# Patient Record
Sex: FEMALE | Race: WHITE | NOT HISPANIC OR LATINO
[De-identification: names, ages, dates, MRNs, and addresses within clinical notes are randomized per-mention and may not be internally consistent; named-entity substitution may affect disease eponyms.]

---

## 2019-02-01 ENCOUNTER — RX RENEWAL (OUTPATIENT)
Age: 19
End: 2019-02-01

## 2019-02-20 ENCOUNTER — RECORD ABSTRACTING (OUTPATIENT)
Age: 19
End: 2019-02-20

## 2019-02-20 DIAGNOSIS — Z80.3 FAMILY HISTORY OF MALIGNANT NEOPLASM OF BREAST: ICD-10-CM

## 2019-02-20 DIAGNOSIS — Z78.9 OTHER SPECIFIED HEALTH STATUS: ICD-10-CM

## 2019-03-12 ENCOUNTER — APPOINTMENT (OUTPATIENT)
Dept: OBGYN | Facility: CLINIC | Age: 19
End: 2019-03-12
Payer: COMMERCIAL

## 2019-03-12 VITALS
DIASTOLIC BLOOD PRESSURE: 60 MMHG | SYSTOLIC BLOOD PRESSURE: 100 MMHG | BODY MASS INDEX: 19.88 KG/M2 | WEIGHT: 108 LBS | HEIGHT: 62 IN

## 2019-03-12 LAB
BILIRUB UR QL STRIP: NORMAL
CLARITY UR: CLEAR
COLLECTION METHOD: NORMAL
GLUCOSE UR-MCNC: NORMAL
HCG UR QL: 1 EU/DL
HCG UR QL: NEGATIVE
HGB UR QL STRIP.AUTO: NORMAL
KETONES UR-MCNC: NORMAL
LEUKOCYTE ESTERASE UR QL STRIP: NORMAL
NITRITE UR QL STRIP: NORMAL
PH UR STRIP: 7.5
PROT UR STRIP-MCNC: NORMAL
QUALITY CONTROL: YES
SP GR UR STRIP: 1.02

## 2019-03-12 PROCEDURE — 99395 PREV VISIT EST AGE 18-39: CPT

## 2019-03-12 NOTE — REVIEW OF SYSTEMS
[Abdominal Pain] : abdominal pain [Painful Periods] : painful periods [Irregular Cycle Intervals] : periods are irregular [Dizziness] : dizziness [Headache] : headache [Anxiety] : anxiety [Nl] : Integumentary

## 2019-03-14 ENCOUNTER — APPOINTMENT (OUTPATIENT)
Dept: OBGYN | Facility: CLINIC | Age: 19
End: 2019-03-14
Payer: COMMERCIAL

## 2019-03-14 VITALS
SYSTOLIC BLOOD PRESSURE: 100 MMHG | BODY MASS INDEX: 19.88 KG/M2 | DIASTOLIC BLOOD PRESSURE: 60 MMHG | HEIGHT: 62 IN | WEIGHT: 108 LBS

## 2019-03-14 LAB
C TRACH RRNA SPEC QL NAA+PROBE: NOT DETECTED
N GONORRHOEA RRNA SPEC QL NAA+PROBE: NOT DETECTED
SOURCE AMPLIFICATION: NORMAL

## 2019-03-14 PROCEDURE — 99213 OFFICE O/P EST LOW 20 MIN: CPT | Mod: 25

## 2019-03-14 PROCEDURE — 76830 TRANSVAGINAL US NON-OB: CPT

## 2019-03-14 NOTE — COUNSELING
[Breast Self Exam] : breast self exam [Nutrition] : nutrition [Exercise] : exercise [STD (testing, results, tx)] : STD (testing, results, tx) [Contraception] : contraception

## 2019-03-14 NOTE — DISCUSSION/SUMMARY
[FreeTextEntry1] : pt will have a pelvic US for further evaluation of her pain. \par \par She is also interested in switching to a different dose of her pill in order to have a withdrawl bleed.  She will start junel fe 1/5/30.\par \par She was referred to dermatology for evaluation of her excessive under arm sweating.

## 2019-03-14 NOTE — PHYSICAL EXAM
[Awake] : awake [Alert] : alert [Soft] : soft [Tender] : non tender [Distended] : not distended [Oriented x3] : oriented to person, place, and time [Normal] : uterus [Labia Majora] : labia major [Tenderness] : nontender [Enlarged ___ wks] : not enlarged [Mass ___ cm] : no uterine mass was palpated [Uterine Adnexae] : were not tender and not enlarged [Adnexa Tenderness] : were not tender [Ovarian Mass (___ Cm)] : there were no adnexal masses

## 2019-03-15 NOTE — DISCUSSION/SUMMARY
[FreeTextEntry1] : f/u with GI/PCP as scheduled.\par \par she will monitor her symptoms on the new ocps.  \par \par if problems persist she will f/u.

## 2019-03-15 NOTE — PHYSICAL EXAM
[Awake] : awake [Alert] : alert [Mass] : no breast mass [Nipple Discharge] : no nipple discharge [Tender] : non tender [Distended] : not distended [Oriented x3] : oriented to person, place, and time

## 2019-05-28 ENCOUNTER — RECORD ABSTRACTING (OUTPATIENT)
Age: 19
End: 2019-05-28

## 2019-05-30 ENCOUNTER — APPOINTMENT (OUTPATIENT)
Dept: OBGYN | Facility: CLINIC | Age: 19
End: 2019-05-30
Payer: COMMERCIAL

## 2019-05-30 VITALS
SYSTOLIC BLOOD PRESSURE: 100 MMHG | BODY MASS INDEX: 19.69 KG/M2 | DIASTOLIC BLOOD PRESSURE: 65 MMHG | HEIGHT: 62 IN | WEIGHT: 107 LBS

## 2019-05-30 LAB
BILIRUB UR QL STRIP: NORMAL
GLUCOSE UR-MCNC: NORMAL
HCG UR QL: 1 EU/DL
HCG UR QL: NEGATIVE
HGB UR QL STRIP.AUTO: NORMAL
KETONES UR-MCNC: NORMAL
LEUKOCYTE ESTERASE UR QL STRIP: NORMAL
NITRITE UR QL STRIP: NORMAL
PH UR STRIP: 6
PROT UR STRIP-MCNC: NORMAL
QUALITY CONTROL: YES
SP GR UR STRIP: 1.02

## 2019-05-30 PROCEDURE — 81003 URINALYSIS AUTO W/O SCOPE: CPT | Mod: QW

## 2019-05-30 PROCEDURE — 99213 OFFICE O/P EST LOW 20 MIN: CPT

## 2019-05-30 PROCEDURE — 81025 URINE PREGNANCY TEST: CPT

## 2019-05-31 NOTE — PHYSICAL EXAM
[Awake] : awake [Alert] : alert [Soft] : soft [Tender] : non tender [Distended] : not distended [Oriented x3] : oriented to person, place, and time [Normal] : uterus [Scant] : there was scant vaginal bleeding [Tenderness] : nontender [Enlarged ___ wks] : not enlarged [Mass ___ cm] : no uterine mass was palpated [Uterine Adnexae] : were not tender and not enlarged

## 2019-05-31 NOTE — HISTORY OF PRESENT ILLNESS
[___ Month(s) Ago] : [unfilled] month(s) ago [Good] : being in good health [Healthy Diet] : a healthy diet [Regular Exercise] : regular exercise [Reproductive Age] : is of reproductive age [Last Screen ___] : last STD screen was [unfilled] [Oral Contraceptive] : uses oral contraception pills [Definite:  ___ (Date)] : the last menstrual period was [unfilled] [Menarche Age: ____] : age at menarche was [unfilled] [Sexually Active] : is sexually active [Contraception] : uses contraception [Oral Contraceptives] : uses oral contraceptives [Male ___] : [unfilled] male [Weight Concerns] : no concerns with her weight [Pregnancy History] : denies prior pregnancies

## 2019-05-31 NOTE — DISCUSSION/SUMMARY
[FreeTextEntry1] : check cultures- tx based on results.  \par \par mother present for visit also a patient (lindsay)

## 2019-06-01 LAB
C TRACH RRNA SPEC QL NAA+PROBE: NOT DETECTED
CANDIDA VAG CYTO: DETECTED
G VAGINALIS+PREV SP MTYP VAG QL MICRO: NOT DETECTED
N GONORRHOEA RRNA SPEC QL NAA+PROBE: NOT DETECTED
SOURCE AMPLIFICATION: NORMAL
T VAGINALIS VAG QL WET PREP: NOT DETECTED

## 2019-06-11 ENCOUNTER — APPOINTMENT (OUTPATIENT)
Dept: PEDIATRICS | Facility: CLINIC | Age: 19
End: 2019-06-11

## 2019-06-20 ENCOUNTER — APPOINTMENT (OUTPATIENT)
Dept: PEDIATRICS | Facility: CLINIC | Age: 19
End: 2019-06-20
Payer: COMMERCIAL

## 2019-06-20 VITALS
DIASTOLIC BLOOD PRESSURE: 68 MMHG | SYSTOLIC BLOOD PRESSURE: 108 MMHG | HEART RATE: 79 BPM | HEIGHT: 61.5 IN | BODY MASS INDEX: 19.61 KG/M2 | WEIGHT: 105.2 LBS

## 2019-06-20 DIAGNOSIS — Z80.0 FAMILY HISTORY OF MALIGNANT NEOPLASM OF DIGESTIVE ORGANS: ICD-10-CM

## 2019-06-20 PROCEDURE — 96127 BRIEF EMOTIONAL/BEHAV ASSMT: CPT

## 2019-06-20 PROCEDURE — 92552 PURE TONE AUDIOMETRY AIR: CPT

## 2019-06-20 PROCEDURE — 99395 PREV VISIT EST AGE 18-39: CPT | Mod: 25

## 2019-06-20 RX ORDER — ALBUTEROL SULFATE 90 UG/1
108 (90 BASE) AEROSOL, METERED RESPIRATORY (INHALATION) EVERY 4 HOURS
Refills: 0 | Status: DISCONTINUED | COMMUNITY
End: 2019-06-20

## 2019-06-20 RX ORDER — TERCONAZOLE 4 MG/G
0.4 CREAM VAGINAL
Qty: 1 | Refills: 0 | Status: DISCONTINUED | COMMUNITY
Start: 2019-06-01 | End: 2019-06-20

## 2019-06-20 RX ORDER — INHALER, ASSIST DEVICES
SPACER (EA) MISCELLANEOUS
Refills: 0 | Status: DISCONTINUED | COMMUNITY
End: 2019-06-20

## 2019-06-20 RX ORDER — NORETHINDRONE ACETATE AND ETHINYL ESTRADIOL AND FERROUS FUMARATE 1MG-20(21)
1-20 KIT ORAL
Qty: 2 | Refills: 0 | Status: DISCONTINUED | COMMUNITY
Start: 2019-02-01 | End: 2019-06-20

## 2019-06-20 NOTE — PHYSICAL EXAM
[Alert] : alert [No Acute Distress] : no acute distress [EOMI Bilateral] : EOMI bilateral [Normocephalic] : normocephalic [Clear tympanic membranes with bony landmarks and light reflex present bilaterally] : clear tympanic membranes with bony landmarks and light reflex present bilaterally  [Pink Nasal Mucosa] : pink nasal mucosa [Nonerythematous Oropharynx] : nonerythematous oropharynx [Supple, full passive range of motion] : supple, full passive range of motion [No Palpable Masses] : no palpable masses [Clear to Ausculatation Bilaterally] : clear to auscultation bilaterally [Normal S1, S2 audible] : normal S1, S2 audible [Regular Rate and Rhythm] : regular rate and rhythm [No Murmurs] : no murmurs [+2 Femoral Pulses] : +2 femoral pulses [Soft] : soft [NonTender] : non tender [Non Distended] : non distended [No Hepatomegaly] : no hepatomegaly [Normoactive Bowel Sounds] : normoactive bowel sounds [No Splenomegaly] : no splenomegaly [Normal Muscle Tone] : normal muscle tone [No Abnormal Lymph Nodes Palpated] : no abnormal lymph nodes palpated [No pain or deformities with palpation of bone, muscles, joints] : no pain or deformities with palpation of bone, muscles, joints [No Gait Asymmetry] : no gait asymmetry [Cranial Nerves Grossly Intact] : cranial nerves grossly intact [+2 Patella DTR] : +2 patella DTR [Straight] : straight [No Rash or Lesions] : no rash or lesions [[place]: ____] : [place] [unfilled]

## 2019-06-20 NOTE — DISCUSSION/SUMMARY
[] : The components of the vaccine(s) to be administered today are listed in the plan of care. The disease(s) for which the vaccine(s) are intended to prevent and the risks have been discussed with the caretaker.  The risks are also included in the appropriate vaccination information statements which have been provided to the patient's caregiver.  The caregiver has given consent to vaccinate. [FreeTextEntry1] : PATIENT HAS ONGOING ABDOMINAL PAIN  APPEARS TO BE EPIGASTRIC\par HAS SEEN G.I. 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

## 2019-06-20 NOTE — RISK ASSESSMENT
[0] : 2) Feeling down, depressed, or hopeless: Not at all (0) [FreeTextEntry1] : Inadequate sleep [[id number]] : PHQ 9 score 3

## 2019-06-20 NOTE — HISTORY OF PRESENT ILLNESS
[Mother] : mother [LMP: _____] : LMP: [unfilled] [Up to date] : Up to date [Eats meals with family] : eats meals with family [Has family members/adults to turn to for help] : has family members/adults to turn to for help [Is permitted and is able to make independent decisions] : Is permitted and is able to make independent decisions [Normal Behavior/Attention] : normal behavior/attention [Normal Homework] : normal homework [Normal Performance] : normal performance [Eats regular meals including adequate fruits and vegetables] : eats regular meals including adequate fruits and vegetables [Drinks non-sweetened liquids] : drinks non-sweetened liquids  [Calcium source] : calcium source [Has friends] : has friends [Screen time (except homework) less than 2 hours a day] : screen time (except homework) less than 2 hours a day [At least 1 hour of physical activity a day] : at least 1 hour of physical activity a day [Has interests/participates in community activities/volunteers] : has interests/participates in community activities/volunteers. [No] : No cigarette smoke exposure [Uses safety belts/safety equipment] : uses safety belts/safety equipment  [Has peer relationships free of violence] : has peer relationships free of violence [Yes] : Patient has had sexual intercourse. [Has ways to cope with stress] : has ways to cope with stress [Displays self-confidence] : displays self-confidence [Has concerns about body or appearance] : does not have concerns about body or appearance [Sleep Concerns] : no sleep concerns [Exposure to electronic nicotine delivery system] : no exposure to electronic nicotine delivery system [Uses tobacco] : does not use tobacco [Uses electronic nicotine delivery system] : does not use electronic nicotine delivery system [Exposure to drugs] : no exposure to drugs [Uses drugs] : does not use drugs  [Exposure to tobacco] : no exposure to tobacco [Drinks alcohol] : does not drink alcohol [Impaired/distracted driving] : no impaired/distracted driving [Exposure to alcohol] : no exposure to alcohol [Has problems with sleep] : does not have problems with sleep [Gets depressed, anxious, or irritable/has mood swings] : does not get depressed, anxious, or irritable/has mood swings [Has thought about hurting self or considered suicide] : has not thought about hurting self or considered suicide [FreeTextEntry7] : 19 year well visit [FreeTextEntry8] : on Junel  every 6 months  [de-identified] : RECURRENT UPPER ABDOMINAL PAIN [de-identified] : finished 2nd year college  PRE MED [de-identified] : SEES GYNECOLOGY FOR ROUTINE VISITS AND LAB WORK

## 2020-03-15 ENCOUNTER — APPOINTMENT (OUTPATIENT)
Dept: PEDIATRICS | Facility: CLINIC | Age: 20
End: 2020-03-15
Payer: COMMERCIAL

## 2020-03-15 VITALS — TEMPERATURE: 97.2 F | WEIGHT: 106.5 LBS

## 2020-03-15 LAB — S PYO AG SPEC QL IA: NEGATIVE

## 2020-03-15 PROCEDURE — 87880 STREP A ASSAY W/OPTIC: CPT | Mod: QW

## 2020-03-15 PROCEDURE — 99213 OFFICE O/P EST LOW 20 MIN: CPT | Mod: 25

## 2020-03-15 NOTE — DISCUSSION/SUMMARY
[FreeTextEntry1] : Symptomatic treatment of fever and/or pain discussed\par Stat strep test ordered\par Throat culture, if POSITIVE, give Duricef 500mg BID x 10 days\par Hydrate well\par Handwashing and infection control discussed\par Return to office if febrile > 48 hours or if symptoms get worse\par Go to ER if unable to come to the office or during after hours, parent encouraged to call service first before doing so.\par

## 2020-03-15 NOTE — PHYSICAL EXAM
[Erythematous Oropharynx] : erythematous oropharynx [Nontender Cervical Lymph Nodes] : nontender cervical lymph nodes [Supple] : supple [FROM] : full passive range of motion [NL] : no abnormal lymph nodes palpated

## 2020-03-15 NOTE — HISTORY OF PRESENT ILLNESS
[de-identified] : sore throat x 1 day [FreeTextEntry6] : Afebrile\par stomach ache\par no cough or congestion\par no headache

## 2020-04-01 ENCOUNTER — APPOINTMENT (OUTPATIENT)
Dept: OBGYN | Facility: CLINIC | Age: 20
End: 2020-04-01
Payer: COMMERCIAL

## 2020-04-01 VITALS
WEIGHT: 105 LBS | HEIGHT: 61.5 IN | DIASTOLIC BLOOD PRESSURE: 70 MMHG | BODY MASS INDEX: 19.57 KG/M2 | SYSTOLIC BLOOD PRESSURE: 106 MMHG

## 2020-04-01 LAB
BILIRUB UR QL STRIP: NORMAL
CLARITY UR: CLEAR
COLLECTION METHOD: NORMAL
GLUCOSE UR-MCNC: NORMAL
HCG UR QL: 0.2 EU/DL
HGB UR QL STRIP.AUTO: ABNORMAL
KETONES UR-MCNC: NORMAL
LEUKOCYTE ESTERASE UR QL STRIP: ABNORMAL
NITRITE UR QL STRIP: NORMAL
PH UR STRIP: 5.5
PROT UR STRIP-MCNC: ABNORMAL
SP GR UR STRIP: 1.03

## 2020-04-01 PROCEDURE — 81003 URINALYSIS AUTO W/O SCOPE: CPT | Mod: QW

## 2020-04-01 PROCEDURE — 99213 OFFICE O/P EST LOW 20 MIN: CPT

## 2020-04-01 NOTE — PHYSICAL EXAM
[Normal] : uterus [Discharge] : a  ~M vaginal discharge was present [Heavy] : heavy [Foul Smelling] : not foul smelling [White] : white [Curds] : curd-like [No Bleeding] : there was no active vaginal bleeding [Uterine Adnexae] : were not tender and not enlarged

## 2020-04-01 NOTE — HISTORY OF PRESENT ILLNESS
[___ Month(s) Ago] : [unfilled] month(s) ago [Good] : being in good health [Healthy Diet] : a healthy diet [Reproductive Age] : is of reproductive age [Sexually Active] : is sexually active [Male ___] : [unfilled] male [Last Screen ___] : last STD screen was [unfilled] [Definite:  ___ (Date)] : the last menstrual period was [unfilled]

## 2020-04-10 ENCOUNTER — APPOINTMENT (OUTPATIENT)
Dept: OBGYN | Facility: CLINIC | Age: 20
End: 2020-04-10

## 2020-05-29 ENCOUNTER — APPOINTMENT (OUTPATIENT)
Dept: OBGYN | Facility: CLINIC | Age: 20
End: 2020-05-29
Payer: COMMERCIAL

## 2020-05-29 VITALS
HEIGHT: 62 IN | SYSTOLIC BLOOD PRESSURE: 102 MMHG | DIASTOLIC BLOOD PRESSURE: 60 MMHG | WEIGHT: 105 LBS | BODY MASS INDEX: 19.32 KG/M2

## 2020-05-29 DIAGNOSIS — N76.0 ACUTE VAGINITIS: ICD-10-CM

## 2020-05-29 DIAGNOSIS — Z87.898 PERSONAL HISTORY OF OTHER SPECIFIED CONDITIONS: ICD-10-CM

## 2020-05-29 LAB
HCG UR QL: NEGATIVE
QUALITY CONTROL: YES

## 2020-05-29 PROCEDURE — 99395 PREV VISIT EST AGE 18-39: CPT

## 2020-05-29 PROCEDURE — 81025 URINE PREGNANCY TEST: CPT

## 2020-05-29 RX ORDER — CHLORHEXIDINE GLUCONATE, 0.12% ORAL RINSE 1.2 MG/ML
0.12 SOLUTION DENTAL
Qty: 473 | Refills: 0 | Status: COMPLETED | COMMUNITY
Start: 2019-12-26 | End: 2020-05-29

## 2020-05-29 RX ORDER — HYDROCODONE BITARTRATE AND ACETAMINOPHEN 5; 325 MG/1; MG/1
5-325 TABLET ORAL
Qty: 12 | Refills: 0 | Status: COMPLETED | COMMUNITY
Start: 2019-12-26 | End: 2020-05-29

## 2020-05-29 RX ORDER — CLOTRIMAZOLE AND BETAMETHASONE DIPROPIONATE 10; .5 MG/G; MG/G
1-0.05 CREAM TOPICAL TWICE DAILY
Qty: 1 | Refills: 0 | Status: COMPLETED | COMMUNITY
Start: 2020-04-01 | End: 2020-05-29

## 2020-05-29 RX ORDER — CLINDAMYCIN HYDROCHLORIDE 150 MG/1
150 CAPSULE ORAL EVERY 6 HOURS
Qty: 28 | Refills: 0 | Status: COMPLETED | COMMUNITY
Start: 2019-12-26 | End: 2020-05-29

## 2020-05-29 RX ORDER — FLUCONAZOLE 150 MG/1
150 TABLET ORAL
Qty: 4 | Refills: 1 | Status: COMPLETED | COMMUNITY
Start: 2020-04-01 | End: 2020-05-29

## 2020-05-29 NOTE — HISTORY OF PRESENT ILLNESS
[1 Year Ago] : 1 year ago [Good] : being in good health [Reproductive Age] : is of reproductive age [Oral Contraceptive] : uses oral contraception pills [Definite:  ___ (Date)] : the last menstrual period was [unfilled] [Sexually Active] : is sexually active [Menarche Age: ____] : age at menarche was [unfilled] [Male ___] : [unfilled] male [Oral Contraceptives] : uses oral contraceptives [Contraception] : uses contraception [Yes] : yes [de-identified] : G/C 03/12/19 NEGATIVE [Pregnancy History] : denies prior pregnancies [Menstrual Problems] : reports normal menses [Regular Cycle Intervals] : periods have been irregular

## 2020-05-29 NOTE — PHYSICAL EXAM
[Awake] : awake [Mass] : no breast mass [Alert] : alert [Soft] : soft [Axillary LAD] : no axillary lymphadenopathy [Nipple Discharge] : no nipple discharge [Tender] : non tender [Distended] : not distended [Oriented x3] : oriented to person, place, and time [Motion Tenderness] : there was no cervical motion tenderness [Normal] : uterus [Enlarged ___ wks] : not enlarged [Mass ___ cm] : no uterine mass was palpated [Tenderness] : nontender [Uterine Adnexae] : were not tender and not enlarged

## 2020-05-29 NOTE — COUNSELING
[Breast Self Exam] : breast self exam [Nutrition] : nutrition [Exercise] : exercise [STD (testing, results, tx)] : STD (testing, results, tx) [Contraception] : contraception [Vitamins/Supplements] : vitamins/supplements

## 2020-06-01 LAB
C TRACH RRNA SPEC QL NAA+PROBE: NOT DETECTED
N GONORRHOEA RRNA SPEC QL NAA+PROBE: NOT DETECTED
SOURCE TP AMPLIFICATION: NORMAL

## 2020-06-02 LAB — CYTOLOGY CVX/VAG DOC THIN PREP: NORMAL

## 2020-06-23 ENCOUNTER — APPOINTMENT (OUTPATIENT)
Dept: PEDIATRICS | Facility: CLINIC | Age: 20
End: 2020-06-23
Payer: COMMERCIAL

## 2020-06-23 VITALS
HEIGHT: 62.5 IN | DIASTOLIC BLOOD PRESSURE: 68 MMHG | BODY MASS INDEX: 18.66 KG/M2 | SYSTOLIC BLOOD PRESSURE: 96 MMHG | WEIGHT: 104 LBS | HEART RATE: 54 BPM

## 2020-06-23 PROCEDURE — 92551 PURE TONE HEARING TEST AIR: CPT

## 2020-06-23 PROCEDURE — 90471 IMMUNIZATION ADMIN: CPT

## 2020-06-23 PROCEDURE — 90472 IMMUNIZATION ADMIN EACH ADD: CPT

## 2020-06-23 PROCEDURE — 99395 PREV VISIT EST AGE 18-39: CPT | Mod: 25

## 2020-06-23 PROCEDURE — 96160 PT-FOCUSED HLTH RISK ASSMT: CPT | Mod: 59

## 2020-06-23 PROCEDURE — 96127 BRIEF EMOTIONAL/BEHAV ASSMT: CPT

## 2020-06-23 PROCEDURE — 90620 MENB-4C VACCINE IM: CPT

## 2020-06-23 PROCEDURE — 90715 TDAP VACCINE 7 YRS/> IM: CPT

## 2020-06-23 RX ORDER — NORETHINDRONE ACETATE AND ETHINYL ESTRADIOL AND FERROUS FUMARATE 1.5-30(21)
1.5-3 KIT ORAL DAILY
Qty: 3 | Refills: 0 | Status: COMPLETED | COMMUNITY
Start: 2019-03-14 | End: 2020-06-23

## 2020-06-23 NOTE — PHYSICAL EXAM
[No Acute Distress] : no acute distress [Alert] : alert [Normocephalic] : normocephalic [Clear tympanic membranes with bony landmarks and light reflex present bilaterally] : clear tympanic membranes with bony landmarks and light reflex present bilaterally  [EOMI Bilateral] : EOMI bilateral [Nonerythematous Oropharynx] : nonerythematous oropharynx [Pink Nasal Mucosa] : pink nasal mucosa [Supple, full passive range of motion] : supple, full passive range of motion [No Palpable Masses] : no palpable masses [Clear to Auscultation Bilaterally] : clear to auscultation bilaterally [Regular Rate and Rhythm] : regular rate and rhythm [Normal S1, S2 audible] : normal S1, S2 audible [No Murmurs] : no murmurs [+2 Femoral Pulses] : +2 femoral pulses [Soft] : soft [NonTender] : non tender [Normoactive Bowel Sounds] : normoactive bowel sounds [Non Distended] : non distended [No Hepatomegaly] : no hepatomegaly [No Splenomegaly] : no splenomegaly [[place]: ____] : [place] [unfilled] [Normal Muscle Tone] : normal muscle tone [No Abnormal Lymph Nodes Palpated] : no abnormal lymph nodes palpated [No Gait Asymmetry] : no gait asymmetry [No pain or deformities with palpation of bone, muscles, joints] : no pain or deformities with palpation of bone, muscles, joints [Straight] : straight [+2 Patella DTR] : +2 patella DTR [Cranial Nerves Grossly Intact] : cranial nerves grossly intact [No Rash or Lesions] : no rash or lesions

## 2020-06-23 NOTE — HISTORY OF PRESENT ILLNESS
[Up to date] : Up to date [Normal] : normal [Eats meals with family] : eats meals with family [LMP: _____] : LMP: [unfilled] [Has family members/adults to turn to for help] : has family members/adults to turn to for help [Is permitted and is able to make independent decisions] : Is permitted and is able to make independent decisions [Drinks non-sweetened liquids] : drinks non-sweetened liquids  [Eats regular meals including adequate fruits and vegetables] : eats regular meals including adequate fruits and vegetables [Calcium source] : calcium source [Has friends] : has friends [At least 1 hour of physical activity a day] : at least 1 hour of physical activity a day [Screen time (except homework) less than 2 hours a day] : screen time (except homework) less than 2 hours a day [Has interests/participates in community activities/volunteers] : has interests/participates in community activities/volunteers. [Uses safety belts/safety equipment] : uses safety belts/safety equipment  [Has peer relationships free of violence] : has peer relationships free of violence [Has ways to cope with stress] : has ways to cope with stress [No] : Patient has not had sexual intercourse. [Displays self-confidence] : displays self-confidence [Yes] : Patient goes to dentist yearly [Sleep Concerns] : no sleep concerns [Has concerns about body or appearance] : does not have concerns about body or appearance [Uses electronic nicotine delivery system] : does not use electronic nicotine delivery system [Exposure to electronic nicotine delivery system] : no exposure to electronic nicotine delivery system [Uses tobacco] : does not use tobacco [Exposure to tobacco] : no exposure to tobacco [Uses drugs] : does not use drugs  [Exposure to drugs] : no exposure to drugs [Drinks alcohol] : does not drink alcohol [Exposure to alcohol] : no exposure to alcohol [Gets depressed, anxious, or irritable/has mood swings] : does not get depressed, anxious, or irritable/has mood swings [Has problems with sleep] : does not have problems with sleep [Has thought about hurting self or considered suicide] : has not thought about hurting self or considered suicide [de-identified] : self [FreeTextEntry7] : 21 yo C [FreeTextEntry8] : on bcp [de-identified] : senior at college

## 2020-08-10 ENCOUNTER — APPOINTMENT (OUTPATIENT)
Dept: OBGYN | Facility: CLINIC | Age: 20
End: 2020-08-10

## 2020-08-19 ENCOUNTER — APPOINTMENT (OUTPATIENT)
Dept: OBGYN | Facility: CLINIC | Age: 20
End: 2020-08-19
Payer: COMMERCIAL

## 2020-08-19 ENCOUNTER — RESULT CHARGE (OUTPATIENT)
Age: 20
End: 2020-08-19

## 2020-08-19 VITALS
BODY MASS INDEX: 19.32 KG/M2 | WEIGHT: 105 LBS | HEIGHT: 62 IN | TEMPERATURE: 98.1 F | DIASTOLIC BLOOD PRESSURE: 62 MMHG | SYSTOLIC BLOOD PRESSURE: 100 MMHG

## 2020-08-19 LAB
BILIRUB UR QL STRIP: NORMAL
GLUCOSE UR-MCNC: NORMAL
HCG UR QL: NEGATIVE
HCG UR QL: NORMAL EU/DL
HGB UR QL STRIP.AUTO: ABNORMAL
KETONES UR-MCNC: NORMAL
NITRITE UR QL STRIP: NORMAL
PH UR STRIP: NORMAL
PROT UR STRIP-MCNC: 0.2
QUALITY CONTROL: YES
SP GR UR STRIP: 1.02

## 2020-08-19 PROCEDURE — 81003 URINALYSIS AUTO W/O SCOPE: CPT | Mod: QW

## 2020-08-19 PROCEDURE — 81025 URINE PREGNANCY TEST: CPT

## 2020-08-19 PROCEDURE — 99214 OFFICE O/P EST MOD 30 MIN: CPT

## 2020-08-19 NOTE — PHYSICAL EXAM
[Awake] : awake [Alert] : alert [Acute Distress] : no acute distress [Soft] : soft [Tender] : non tender [Depressed Mood] : not depressed [Distended] : not distended [None] : no CVA tenderness [Oriented x3] : oriented to person, place, and time [Labia Majora] : labia major [No Bleeding] : there was no active vaginal bleeding [Normal] : clitoris [Labia Minora] : labia minora [Enlarged ___ wks] : not enlarged [Motion Tenderness] : there was no cervical motion tenderness [Tenderness] : nontender [Mass ___ cm] : no uterine mass was palpated [Uterine Adnexae] : were not tender and not enlarged

## 2020-08-19 NOTE — HISTORY OF PRESENT ILLNESS
[___ Month(s) Ago] : [unfilled] month(s) ago [Good] : being in good health [Healthy Diet] : a healthy diet [Regular Exercise] : regular exercise [Last Pap ___] : Last cervical pap smear was [unfilled] [Reproductive Age] : is of reproductive age [Contraception] : uses contraception [Last Screen ___] : last STD screen was [unfilled] [Definite:  ___ (Date)] : the last menstrual period was [unfilled] [Menarche Age: ____] : age at menarche was [unfilled] [Sexually Active] : is sexually active [Monogamous] : is monogamous [Male ___] : [unfilled] male [Yes] : yes

## 2020-08-24 LAB
BACTERIA UR CULT: NORMAL
C TRACH RRNA SPEC QL NAA+PROBE: NOT DETECTED
CANDIDA VAG CYTO: NOT DETECTED
G VAGINALIS+PREV SP MTYP VAG QL MICRO: NOT DETECTED
N GONORRHOEA RRNA SPEC QL NAA+PROBE: NOT DETECTED
SOURCE AMPLIFICATION: NORMAL
SOURCE AMPLIFICATION: NORMAL
T VAGINALIS RRNA SPEC QL NAA+PROBE: NOT DETECTED
T VAGINALIS VAG QL WET PREP: NOT DETECTED

## 2020-12-20 ENCOUNTER — APPOINTMENT (OUTPATIENT)
Dept: PEDIATRICS | Facility: CLINIC | Age: 20
End: 2020-12-20
Payer: COMMERCIAL

## 2020-12-20 VITALS — TEMPERATURE: 98 F | WEIGHT: 108 LBS

## 2020-12-20 LAB — S PYO AG SPEC QL IA: POSITIVE

## 2020-12-20 PROCEDURE — 87880 STREP A ASSAY W/OPTIC: CPT | Mod: QW

## 2020-12-20 PROCEDURE — 99214 OFFICE O/P EST MOD 30 MIN: CPT | Mod: 25

## 2020-12-20 PROCEDURE — 99072 ADDL SUPL MATRL&STAF TM PHE: CPT

## 2020-12-20 NOTE — PHYSICAL EXAM
[Erythematous Oropharynx] : erythematous oropharynx [Nontender Cervical Lymph Nodes] : nontender cervical lymph nodes [Supple] : supple [FROM] : full passive range of motion [NL] : warm

## 2020-12-20 NOTE — DISCUSSION/SUMMARY
[FreeTextEntry1] : 20 year girl found to be rapid strep positive. Complete 10 days of antibiotics. Use antipyretics as needed. Return for follow up in 2 weeks. After being on antibiotics for atleast 24 hours patient less likely to spread infection.\par Covid testing done at pts request, no known contacts

## 2020-12-22 LAB — SARS-COV-2 N GENE NPH QL NAA+PROBE: NOT DETECTED

## 2020-12-28 ENCOUNTER — APPOINTMENT (OUTPATIENT)
Dept: OBGYN | Facility: CLINIC | Age: 20
End: 2020-12-28
Payer: COMMERCIAL

## 2020-12-28 VITALS
WEIGHT: 105 LBS | SYSTOLIC BLOOD PRESSURE: 104 MMHG | DIASTOLIC BLOOD PRESSURE: 62 MMHG | BODY MASS INDEX: 19.32 KG/M2 | HEIGHT: 62 IN

## 2020-12-28 LAB
BILIRUB UR QL STRIP: NORMAL
GLUCOSE UR-MCNC: NORMAL
HCG UR QL: 1 EU/DL
HCG UR QL: NEGATIVE
HGB UR QL STRIP.AUTO: ABNORMAL
KETONES UR-MCNC: ABNORMAL
LEUKOCYTE ESTERASE UR QL STRIP: ABNORMAL
NITRITE UR QL STRIP: NORMAL
PH UR STRIP: 7
PROT UR STRIP-MCNC: ABNORMAL
QUALITY CONTROL: YES
SP GR UR STRIP: 1.02

## 2020-12-28 PROCEDURE — 99072 ADDL SUPL MATRL&STAF TM PHE: CPT

## 2020-12-28 PROCEDURE — 99213 OFFICE O/P EST LOW 20 MIN: CPT

## 2020-12-28 PROCEDURE — 81003 URINALYSIS AUTO W/O SCOPE: CPT | Mod: QW

## 2020-12-28 PROCEDURE — 81025 URINE PREGNANCY TEST: CPT

## 2020-12-28 NOTE — HISTORY OF PRESENT ILLNESS
[Last Pap ___] : Last cervical pap smear was [unfilled] [unknown] : the patient is unsure of the date of her LMP [Menarche Age: ____] : age at menarche was [unfilled] [Sexually Active] : is sexually active [Contraception] : uses contraception [Male ___] : [unfilled] male

## 2020-12-30 ENCOUNTER — TRANSCRIPTION ENCOUNTER (OUTPATIENT)
Age: 20
End: 2020-12-30

## 2020-12-30 LAB
C TRACH RRNA SPEC QL NAA+PROBE: NOT DETECTED
CANDIDA VAG CYTO: NOT DETECTED
G VAGINALIS+PREV SP MTYP VAG QL MICRO: NOT DETECTED
N GONORRHOEA RRNA SPEC QL NAA+PROBE: NOT DETECTED
SOURCE AMPLIFICATION: NORMAL
T VAGINALIS VAG QL WET PREP: NOT DETECTED

## 2020-12-30 NOTE — PHYSICAL EXAM
[Labia Majora] : labia major [Labia Minora] : labia minora [Normal] : clitoris [No Bleeding] : there was no active vaginal bleeding [Motion Tenderness] : there was no cervical motion tenderness [Uterine Adnexae] : were not tender and not enlarged

## 2020-12-31 ENCOUNTER — NON-APPOINTMENT (OUTPATIENT)
Age: 20
End: 2020-12-31

## 2021-05-24 ENCOUNTER — TRANSCRIPTION ENCOUNTER (OUTPATIENT)
Age: 21
End: 2021-05-24

## 2021-06-02 ENCOUNTER — APPOINTMENT (OUTPATIENT)
Dept: OBGYN | Facility: CLINIC | Age: 21
End: 2021-06-02
Payer: COMMERCIAL

## 2021-06-02 VITALS
WEIGHT: 103 LBS | HEIGHT: 62 IN | BODY MASS INDEX: 18.95 KG/M2 | SYSTOLIC BLOOD PRESSURE: 102 MMHG | DIASTOLIC BLOOD PRESSURE: 62 MMHG

## 2021-06-02 LAB
HCG UR QL: NEGATIVE
QUALITY CONTROL: YES

## 2021-06-02 PROCEDURE — 81025 URINE PREGNANCY TEST: CPT

## 2021-06-02 PROCEDURE — 99395 PREV VISIT EST AGE 18-39: CPT

## 2021-06-02 PROCEDURE — 99072 ADDL SUPL MATRL&STAF TM PHE: CPT

## 2021-06-02 NOTE — PLAN
[FreeTextEntry1] : - f/u gc/ct culture results\par - 2020 pap wnl, no pap today per guidelines\par - pt had gardasil\par - continue ocps\par

## 2021-06-02 NOTE — HISTORY OF PRESENT ILLNESS
[Patient reported PAP Smear was normal] : Patient reported PAP Smear was normal [Gonorrhea test offered] : Gonorrhea test offered [Chlamydia test offered] : Chlamydia test offered [N] : Patient denies prior pregnancies [Menarche Age: ____] : age at menarche was [unfilled] [Oral Contraceptive] : uses oral contraception pills [Y] : Patient is sexually active [Currently Active] : currently active [Men] : men [Vaginal] : vaginal [TextBox_4] : PATIENT PRESENTS FOR ANNUAL EXAM. \par \par She denies gyn complaints today.  She is doing well with ocps.\par \par she just graduated college- plans to go to grad school/thinking about dental school.\par \par  [PapSmeardate] : 05/29/20 [TextBox_31] : NEG [GonorrheaDate] : 12/28/20 [TextBox_63] : NEG [ChlamydiaDate] : 12/28/20 [TextBox_68] : NEG [PGHxTotal] : 0

## 2021-06-10 ENCOUNTER — NON-APPOINTMENT (OUTPATIENT)
Age: 21
End: 2021-06-10

## 2021-08-13 ENCOUNTER — APPOINTMENT (OUTPATIENT)
Dept: PEDIATRICS | Facility: CLINIC | Age: 21
End: 2021-08-13
Payer: COMMERCIAL

## 2021-08-13 VITALS
BODY MASS INDEX: 19.51 KG/M2 | WEIGHT: 106 LBS | HEART RATE: 68 BPM | SYSTOLIC BLOOD PRESSURE: 114 MMHG | HEIGHT: 62 IN | DIASTOLIC BLOOD PRESSURE: 70 MMHG

## 2021-08-13 DIAGNOSIS — Z87.09 PERSONAL HISTORY OF OTHER DISEASES OF THE RESPIRATORY SYSTEM: ICD-10-CM

## 2021-08-13 DIAGNOSIS — Z00.00 ENCOUNTER FOR GENERAL ADULT MEDICAL EXAMINATION W/OUT ABNORMAL FINDINGS: ICD-10-CM

## 2021-08-13 LAB
BILIRUB UR QL STRIP: NEGATIVE
CLARITY UR: CLEAR
COLLECTION METHOD: NORMAL
GLUCOSE UR-MCNC: NEGATIVE
HCG UR QL: 0.2 EU/DL
HGB UR QL STRIP.AUTO: NEGATIVE
KETONES UR-MCNC: NEGATIVE
LEUKOCYTE ESTERASE UR QL STRIP: NEGATIVE
NITRITE UR QL STRIP: NEGATIVE
PH UR STRIP: 7
PROT UR STRIP-MCNC: NEGATIVE
SP GR UR STRIP: 1.02

## 2021-08-13 PROCEDURE — 96160 PT-FOCUSED HLTH RISK ASSMT: CPT | Mod: 59

## 2021-08-13 PROCEDURE — 99395 PREV VISIT EST AGE 18-39: CPT | Mod: 25

## 2021-08-13 PROCEDURE — 96127 BRIEF EMOTIONAL/BEHAV ASSMT: CPT

## 2021-08-13 PROCEDURE — 99173 VISUAL ACUITY SCREEN: CPT | Mod: 59

## 2021-08-13 PROCEDURE — 90620 MENB-4C VACCINE IM: CPT

## 2021-08-13 PROCEDURE — 81003 URINALYSIS AUTO W/O SCOPE: CPT | Mod: QW

## 2021-08-13 PROCEDURE — 92551 PURE TONE HEARING TEST AIR: CPT

## 2021-08-13 PROCEDURE — 90471 IMMUNIZATION ADMIN: CPT

## 2021-08-13 RX ORDER — CLOTRIMAZOLE AND BETAMETHASONE DIPROPIONATE 10; .5 MG/G; MG/G
1-0.05 CREAM TOPICAL 3 TIMES DAILY
Qty: 1 | Refills: 1 | Status: DISCONTINUED | COMMUNITY
Start: 2020-12-30 | End: 2021-08-13

## 2021-08-13 RX ORDER — CEFADROXIL 500 MG/1
500 CAPSULE ORAL
Qty: 20 | Refills: 0 | Status: DISCONTINUED | COMMUNITY
Start: 2020-12-20 | End: 2021-08-13

## 2021-08-13 NOTE — HISTORY OF PRESENT ILLNESS
[LMP: _____] : LMP: [unfilled] [Up to date] : Up to date [Normal Performance] : normal performance [Normal Behavior/Attention] : normal behavior/attention [Normal Homework] : normal homework [Eats regular meals including adequate fruits and vegetables] : eats regular meals including adequate fruits and vegetables [Drinks non-sweetened liquids] : drinks non-sweetened liquids  [Calcium source] : calcium source [Has friends] : has friends [At least 1 hour of physical activity a day] : at least 1 hour of physical activity a day [Screen time (except homework) less than 2 hours a day] : screen time (except homework) less than 2 hours a day [Has interests/participates in community activities/volunteers] : has interests/participates in community activities/volunteers. [Drinks alcohol] : drinks alcohol [No] : No cigarette smoke exposure [Uses safety belts/safety equipment] : uses safety belts/safety equipment  [Has peer relationships free of violence] : has peer relationships free of violence [Yes] : Patient has had sexual intercourse. [HIV Screening Declined] : HIV Screening Declined [Has ways to cope with stress] : has ways to cope with stress [Displays self-confidence] : displays self-confidence [Has concerns about body or appearance] : does not have concerns about body or appearance [Uses electronic nicotine delivery system] : does not use electronic nicotine delivery system [Exposure to electronic nicotine delivery system] : no exposure to electronic nicotine delivery system [Uses tobacco] : does not use tobacco [Exposure to tobacco] : no exposure to tobacco [Uses drugs] : does not use drugs  [Exposure to drugs] : no exposure to drugs [Exposure to alcohol] : no exposure to alcohol [Impaired/distracted driving] : no impaired/distracted driving [Has problems with sleep] : does not have problems with sleep [Gets depressed, anxious, or irritable/has mood swings] : does not get depressed, anxious, or irritable/has mood swings [Has thought about hurting self or considered suicide] : has not thought about hurting self or considered suicide [de-identified] : self [FreeTextEntry7] : 21 year [employer] [de-identified] : Graduated college  runns track at school Wants to go to dental school [de-identified] : sees gynecology

## 2021-08-13 NOTE — PHYSICAL EXAM

## 2022-03-19 ENCOUNTER — TRANSCRIPTION ENCOUNTER (OUTPATIENT)
Age: 22
End: 2022-03-19

## 2022-07-30 ENCOUNTER — APPOINTMENT (OUTPATIENT)
Dept: OBGYN | Facility: CLINIC | Age: 22
End: 2022-07-30

## 2022-07-30 ENCOUNTER — NON-APPOINTMENT (OUTPATIENT)
Age: 22
End: 2022-07-30

## 2022-07-30 VITALS
HEIGHT: 62 IN | BODY MASS INDEX: 19.14 KG/M2 | SYSTOLIC BLOOD PRESSURE: 98 MMHG | DIASTOLIC BLOOD PRESSURE: 64 MMHG | WEIGHT: 104 LBS

## 2022-07-30 LAB
HCG UR QL: NEGATIVE
QUALITY CONTROL: YES

## 2022-07-30 PROCEDURE — 99395 PREV VISIT EST AGE 18-39: CPT

## 2022-07-30 PROCEDURE — 81025 URINE PREGNANCY TEST: CPT

## 2022-07-30 NOTE — HISTORY OF PRESENT ILLNESS
[Oral Contraceptive] : uses oral contraception pills [Y] : Patient is sexually active [N] : Patient denies prior pregnancies [Menarche Age: ____] : age at menarche was [unfilled] [Currently Active] : currently active [TextBox_4] : Penelope is here for her annual exam. 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

## 2022-07-30 NOTE — HISTORY OF PRESENT ILLNESS
[Oral Contraceptive] : uses oral contraception pills [Y] : Patient is sexually active [N] : Patient denies prior pregnancies [Menarche Age: ____] : age at menarche was [unfilled] [Currently Active] : currently active [TextBox_4] : Penelope is here for her annual exam. 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

## 2022-07-30 NOTE — PLAN
[FreeTextEntry1] : - no pap per guidelines- next pap due 2023\par - continue ocps\par - self breast exams/breast awareness encouraged

## 2022-11-11 ENCOUNTER — APPOINTMENT (OUTPATIENT)
Dept: ANTEPARTUM | Facility: CLINIC | Age: 22
End: 2022-11-11

## 2022-11-11 ENCOUNTER — APPOINTMENT (OUTPATIENT)
Dept: OBGYN | Facility: CLINIC | Age: 22
End: 2022-11-11

## 2022-11-11 ENCOUNTER — ASOB RESULT (OUTPATIENT)
Age: 22
End: 2022-11-11

## 2022-11-11 VITALS
WEIGHT: 103 LBS | SYSTOLIC BLOOD PRESSURE: 109 MMHG | DIASTOLIC BLOOD PRESSURE: 65 MMHG | BODY MASS INDEX: 18.95 KG/M2 | HEIGHT: 62 IN

## 2022-11-11 DIAGNOSIS — R10.2 PELVIC AND PERINEAL PAIN: ICD-10-CM

## 2022-11-11 LAB
BILIRUB UR QL STRIP: NORMAL
CLARITY UR: CLEAR
COLLECTION METHOD: NORMAL
GLUCOSE UR-MCNC: NORMAL
HCG UR QL: 0.2 EU/DL
HCG UR QL: NEGATIVE
HGB UR QL STRIP.AUTO: ABNORMAL
KETONES UR-MCNC: NORMAL
LEUKOCYTE ESTERASE UR QL STRIP: NORMAL
NITRITE UR QL STRIP: NORMAL
PH UR STRIP: 6
PROT UR STRIP-MCNC: ABNORMAL
QUALITY CONTROL: YES
SP GR UR STRIP: 1.03

## 2022-11-11 PROCEDURE — 81025 URINE PREGNANCY TEST: CPT

## 2022-11-11 PROCEDURE — 81003 URINALYSIS AUTO W/O SCOPE: CPT | Mod: QW

## 2022-11-11 PROCEDURE — 76830 TRANSVAGINAL US NON-OB: CPT

## 2022-11-11 PROCEDURE — 99213 OFFICE O/P EST LOW 20 MIN: CPT

## 2022-11-11 NOTE — PHYSICAL EXAM
[Chaperone Present] : A chaperone was present in the examining room during all aspects of the physical examination [FreeTextEntry1] : Marybel SOLANO [employer] [Appropriately responsive] : appropriately responsive [Alert] : alert [No Acute Distress] : no acute distress [Oriented x3] : oriented x3 [Normal] : normal [Tenderness] : nontender [Enlarged ___ wks] : not enlarged [Mass ___ cm] : no uterine mass was palpated [Uterine Adnexae] : non-palpable [FreeTextEntry8] : mild suprapubic tenderness

## 2022-11-11 NOTE — PLAN
[FreeTextEntry1] : - tx for uti initiated\par - encouraged fluids\par - cultures obtained\par - pt requested gc.ct testing as she is worried her partner has other partners\par - advised follow up with her pcp should the pain continue and cultures are negative\par - normal sono reviewed with pt today.

## 2022-11-11 NOTE — HISTORY OF PRESENT ILLNESS
[TextBox_4] : Penelope is here for c/o left inguinal pain radiating to her left leg over the last few days.  She denies vaginal discomfort.  \par \par She left a urine sample today and stated her urine looked cloudy. [PapSmeardate] : 5/29/20 [TextBox_31] : negative

## 2022-11-18 ENCOUNTER — TRANSCRIPTION ENCOUNTER (OUTPATIENT)
Age: 22
End: 2022-11-18

## 2022-11-18 LAB
APPEARANCE: CLEAR
BACTERIA: NEGATIVE
BILIRUBIN URINE: NEGATIVE
BLOOD URINE: ABNORMAL
COLOR: YELLOW
GLUCOSE QUALITATIVE U: NEGATIVE
HYALINE CASTS: 4 /LPF
KETONES URINE: NEGATIVE
LEUKOCYTE ESTERASE URINE: NEGATIVE
MICROSCOPIC-UA: NORMAL
NITRITE URINE: NEGATIVE
PH URINE: 6
PROTEIN URINE: ABNORMAL
RED BLOOD CELLS URINE: 2 /HPF
SPECIFIC GRAVITY URINE: 1.03
SQUAMOUS EPITHELIAL CELLS: 5 /HPF
UROBILINOGEN URINE: NORMAL
WHITE BLOOD CELLS URINE: 3 /HPF

## 2023-08-05 ENCOUNTER — LABORATORY RESULT (OUTPATIENT)
Age: 23
End: 2023-08-05

## 2023-08-05 ENCOUNTER — APPOINTMENT (OUTPATIENT)
Dept: OBGYN | Facility: CLINIC | Age: 23
End: 2023-08-05
Payer: COMMERCIAL

## 2023-08-05 ENCOUNTER — NON-APPOINTMENT (OUTPATIENT)
Age: 23
End: 2023-08-05

## 2023-08-05 VITALS
BODY MASS INDEX: 19.32 KG/M2 | SYSTOLIC BLOOD PRESSURE: 90 MMHG | DIASTOLIC BLOOD PRESSURE: 60 MMHG | WEIGHT: 105 LBS | HEIGHT: 62 IN

## 2023-08-05 PROCEDURE — 99395 PREV VISIT EST AGE 18-39: CPT

## 2023-08-05 PROCEDURE — 36415 COLL VENOUS BLD VENIPUNCTURE: CPT

## 2023-08-05 RX ORDER — NORETHINDRONE ACETATE AND ETHINYL ESTRADIOL AND FERROUS FUMARATE 1.5-30(21)
1.5-3 KIT ORAL
Qty: 3 | Refills: 3 | Status: ACTIVE | COMMUNITY
Start: 2020-05-29 | End: 1900-01-01

## 2023-08-05 RX ORDER — NITROFURANTOIN (MONOHYDRATE/MACROCRYSTALS) 25; 75 MG/1; MG/1
100 CAPSULE ORAL
Qty: 14 | Refills: 0 | Status: DISCONTINUED | COMMUNITY
Start: 2022-11-11 | End: 2023-08-05

## 2023-08-05 NOTE — PHYSICAL EXAM
[Chaperone Present] : A chaperone was present in the examining room during all aspects of the physical examination [Appropriately responsive] : appropriately responsive [Alert] : alert [No Acute Distress] : no acute distress [Soft] : soft [Non-tender] : non-tender [Non-distended] : non-distended [No HSM] : No HSM [No Lesions] : no lesions [No Mass] : no mass [Oriented x3] : oriented x3 [Examination Of The Breasts] : a normal appearance [No Masses] : no breast masses were palpable [Labia Majora] : normal [Labia Minora] : normal [Uterine Adnexae] : normal [Normal] : normal [FreeTextEntry1] : ELOISA Conklin

## 2023-08-05 NOTE — HISTORY OF PRESENT ILLNESS
[Oral Contraceptive] : uses oral contraception pills [Y] : Patient is sexually active [N] : Patient denies prior pregnancies [Menarche Age: ____] : age at menarche was [unfilled] [Currently Active] : currently active [TextBox_4] : demetrius is here for her annual exam. She denies gyn complaints today.  she was on a cruise recently and developed a UTI. She did not treat it until she came home- went to [facility] ER and was given IV abx and a week of PO abx. Feeling better now. finished treatment on 7/21.    She is requesting std screening today/no longer with her partner [PapSmeardate] : 5/29/2020 [TextBox_31] : wnl [GonorrheaDate] : 11/11/2022 [TextBox_63] : neg [LMPDate] : 7/15/23 [FreeTextEntry1] : 7/15/23

## 2023-08-05 NOTE — PLAN
[FreeTextEntry1] : std testing - serology testing performed today.  no hsv collected- discussed recommendations for testing with pt. Come in for evaluation if symptoms occur - gc/ct from pap  pap obtained had gardasil vaccine  self breast exams encouraged  continue with ocps

## 2023-08-07 LAB
HAV IGM SER QL: NONREACTIVE
HBV CORE IGM SER QL: NONREACTIVE
HBV SURFACE AG SER QL: NONREACTIVE
HCV AB SER QL: NONREACTIVE
HCV S/CO RATIO: 0.12 S/CO
HIV1+2 AB SPEC QL IA.RAPID: NONREACTIVE
T PALLIDUM AB SER QL IA: NEGATIVE

## 2023-08-09 LAB
C TRACH RRNA SPEC QL NAA+PROBE: NOT DETECTED
N GONORRHOEA RRNA SPEC QL NAA+PROBE: NOT DETECTED
SOURCE AMPLIFICATION: NORMAL
SOURCE TP AMPLIFICATION: NORMAL
T VAGINALIS RRNA SPEC QL NAA+PROBE: NOT DETECTED

## 2023-08-12 LAB — CYTOLOGY CVX/VAG DOC THIN PREP: ABNORMAL

## 2023-08-15 ENCOUNTER — TRANSCRIPTION ENCOUNTER (OUTPATIENT)
Age: 23
End: 2023-08-15

## 2023-08-28 ENCOUNTER — APPOINTMENT (OUTPATIENT)
Dept: OBGYN | Facility: CLINIC | Age: 23
End: 2023-08-28
Payer: COMMERCIAL

## 2023-08-28 VITALS
SYSTOLIC BLOOD PRESSURE: 100 MMHG | TEMPERATURE: 97 F | HEIGHT: 62 IN | BODY MASS INDEX: 19.32 KG/M2 | DIASTOLIC BLOOD PRESSURE: 62 MMHG | WEIGHT: 105 LBS

## 2023-08-28 DIAGNOSIS — Z11.4 ENCOUNTER FOR SCREENING FOR HUMAN IMMUNODEFICIENCY VIRUS [HIV]: ICD-10-CM

## 2023-08-28 DIAGNOSIS — Z12.4 ENCOUNTER FOR SCREENING FOR MALIGNANT NEOPLASM OF CERVIX: ICD-10-CM

## 2023-08-28 DIAGNOSIS — Z01.419 ENCOUNTER FOR GYNECOLOGICAL EXAMINATION (GENERAL) (ROUTINE) W/OUT ABNORMAL FINDINGS: ICD-10-CM

## 2023-08-28 DIAGNOSIS — R30.0 DYSURIA: ICD-10-CM

## 2023-08-28 DIAGNOSIS — N89.8 OTHER SPECIFIED NONINFLAMMATORY DISORDERS OF VAGINA: ICD-10-CM

## 2023-08-28 DIAGNOSIS — R10.2 PELVIC AND PERINEAL PAIN: ICD-10-CM

## 2023-08-28 DIAGNOSIS — N94.9 UNSPECIFIED CONDITION ASSOCIATED WITH FEMALE GENITAL ORGANS AND MENSTRUAL CYCLE: ICD-10-CM

## 2023-08-28 DIAGNOSIS — Z30.09 ENCOUNTER FOR OTHER GENERAL COUNSELING AND ADVICE ON CONTRACEPTION: ICD-10-CM

## 2023-08-28 DIAGNOSIS — R39.89 OTHER SYMPTOMS AND SIGNS INVOLVING THE GENITOURINARY SYSTEM: ICD-10-CM

## 2023-08-28 DIAGNOSIS — Z11.3 ENCOUNTER FOR SCREENING FOR INFECTIONS WITH A PREDOMINANTLY SEXUAL MODE OF TRANSMISSION: ICD-10-CM

## 2023-08-28 DIAGNOSIS — Z23 ENCOUNTER FOR IMMUNIZATION: ICD-10-CM

## 2023-08-28 DIAGNOSIS — Z11.59 ENCOUNTER FOR SCREENING FOR OTHER VIRAL DISEASES: ICD-10-CM

## 2023-08-28 DIAGNOSIS — L29.2 PRURITUS VULVAE: ICD-10-CM

## 2023-08-28 LAB
BILIRUB UR QL STRIP: NORMAL
GLUCOSE UR-MCNC: NORMAL
HCG UR QL: 0.2 EU/DL
HCG UR QL: NEGATIVE
HGB UR QL STRIP.AUTO: NORMAL
KETONES UR-MCNC: NORMAL
LEUKOCYTE ESTERASE UR QL STRIP: ABNORMAL
NITRITE UR QL STRIP: NORMAL
PH UR STRIP: 7
PROT UR STRIP-MCNC: NORMAL
QUALITY CONTROL: YES
SP GR UR STRIP: 1.02

## 2023-08-28 PROCEDURE — 81003 URINALYSIS AUTO W/O SCOPE: CPT | Mod: QW

## 2023-08-28 PROCEDURE — 81025 URINE PREGNANCY TEST: CPT

## 2023-08-28 PROCEDURE — 99213 OFFICE O/P EST LOW 20 MIN: CPT

## 2023-08-28 RX ORDER — TERCONAZOLE 8 MG/G
0.8 CREAM VAGINAL
Qty: 1 | Refills: 2 | Status: ACTIVE | COMMUNITY
Start: 2023-08-28 | End: 1900-01-01

## 2023-08-28 RX ORDER — CLOTRIMAZOLE AND BETAMETHASONE DIPROPIONATE 10; .5 MG/G; MG/G
1-0.05 CREAM TOPICAL TWICE DAILY
Qty: 1 | Refills: 2 | Status: ACTIVE | COMMUNITY
Start: 2023-08-28 | End: 1900-01-01

## 2023-08-30 DIAGNOSIS — N76.0 ACUTE VAGINITIS: ICD-10-CM

## 2023-08-30 DIAGNOSIS — B96.89 ACUTE VAGINITIS: ICD-10-CM

## 2023-08-30 LAB
BACTERIA UR CULT: NORMAL
C TRACH RRNA SPEC QL NAA+PROBE: NOT DETECTED
CANDIDA VAG CYTO: DETECTED
G VAGINALIS+PREV SP MTYP VAG QL MICRO: DETECTED
N GONORRHOEA RRNA SPEC QL NAA+PROBE: NOT DETECTED
SOURCE AMPLIFICATION: NORMAL
T VAGINALIS VAG QL WET PREP: NOT DETECTED

## 2023-08-30 NOTE — END OF VISIT
[FreeTextEntry3] : I, Barber Galan solely acted as scribe for Dr. Nay Kenny on 08/28/2023  All medical entries made by the Scribe were at my, Dr. Quach, direction and personally dictated by me on 08/28/2023 . I have reviewed the chart and agree that the record accurately reflects my personal performance of the history, physical exam, assessment and plan. I have also personally directed, reviewed, and agreed with the chart.

## 2023-08-30 NOTE — DISCUSSION/SUMMARY
[FreeTextEntry1] : Possible yeast infection noted on speculum exam. Prescriptions for Terconazole and Lotrisone given. She is aware that if she is not better in one week to return for another evaluation.   UA and Affirm cultures collected.   F/u annually or as needed.

## 2023-08-30 NOTE — HISTORY OF PRESENT ILLNESS
[Oral Contraceptive] : uses oral contraception pills [Y] : Patient is sexually active [N] : Patient denies prior pregnancies [Menarche Age: ____] : age at menarche was [unfilled] [No] : Patient does not have concerns regarding sex [Currently Active] : currently active [Men] : men [PapSmeardate] : 08/05/23 [TextBox_31] : ASC-US [GonorrheaDate] : 08/05/23 [TextBox_63] : NEG [ChlamydiaDate] : 08/05/23 [TextBox_68] : NEG [TrichomonasDate] : 08/05/23 [TextBox_73] : NEG [LMPDate] : 07/15/23 [PGHxTotal] : 0 [FreeTextEntry1] : 07/15/23

## 2023-09-01 RX ORDER — METRONIDAZOLE 7.5 MG/G
0.75 CREAM TOPICAL DAILY
Qty: 1 | Refills: 1 | Status: ACTIVE | COMMUNITY
Start: 2023-08-30

## 2024-07-24 ENCOUNTER — RX RENEWAL (OUTPATIENT)
Age: 24
End: 2024-07-24

## 2024-10-05 ENCOUNTER — APPOINTMENT (OUTPATIENT)
Dept: OBGYN | Facility: CLINIC | Age: 24
End: 2024-10-05
Payer: COMMERCIAL

## 2024-10-05 ENCOUNTER — NON-APPOINTMENT (OUTPATIENT)
Age: 24
End: 2024-10-05

## 2024-10-05 ENCOUNTER — LABORATORY RESULT (OUTPATIENT)
Age: 24
End: 2024-10-05

## 2024-10-05 VITALS
BODY MASS INDEX: 19.32 KG/M2 | DIASTOLIC BLOOD PRESSURE: 60 MMHG | SYSTOLIC BLOOD PRESSURE: 102 MMHG | HEIGHT: 62 IN | WEIGHT: 105 LBS

## 2024-10-05 DIAGNOSIS — Z01.419 ENCOUNTER FOR GYNECOLOGICAL EXAMINATION (GENERAL) (ROUTINE) W/OUT ABNORMAL FINDINGS: ICD-10-CM

## 2024-10-05 DIAGNOSIS — Z83.49 FAMILY HISTORY OF OTHER ENDOCRINE, NUTRITIONAL AND METABOLIC DISEASES: ICD-10-CM

## 2024-10-05 DIAGNOSIS — N94.10 UNSPECIFIED DYSPAREUNIA: ICD-10-CM

## 2024-10-05 DIAGNOSIS — Z12.4 ENCOUNTER FOR SCREENING FOR MALIGNANT NEOPLASM OF CERVIX: ICD-10-CM

## 2024-10-05 DIAGNOSIS — Z11.3 ENCOUNTER FOR SCREENING FOR INFECTIONS WITH A PREDOMINANTLY SEXUAL MODE OF TRANSMISSION: ICD-10-CM

## 2024-10-05 DIAGNOSIS — Z30.09 ENCOUNTER FOR OTHER GENERAL COUNSELING AND ADVICE ON CONTRACEPTION: ICD-10-CM

## 2024-10-05 LAB
HCG UR QL: NEGATIVE
QUALITY CONTROL: YES

## 2024-10-05 PROCEDURE — 99395 PREV VISIT EST AGE 18-39: CPT

## 2024-10-05 PROCEDURE — 81025 URINE PREGNANCY TEST: CPT

## 2024-10-05 PROCEDURE — 99459 PELVIC EXAMINATION: CPT

## 2024-10-05 NOTE — HISTORY OF PRESENT ILLNESS
[Oral Contraceptive] : uses oral contraception pills [Y] : Patient is sexually active [N] : Patient denies prior pregnancies [Menarche Age: ____] : age at menarche was [unfilled] [Currently Active] : currently active [Men] : men [No] : No [Yes] : Yes [TextBox_4] : Penelope is here for her annual exam.  2023 pap showed ASCUS HPV.   She is having some mid pelvic discomfort during sex over the last month or so.  She is with the same partner. She believes the pain initially happened with deep penetration/specific position.  Since then she has had pain with sex.  she denies urinary symptoms, irregular bleeding or pelvic pain when not having sex.  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

## 2024-10-05 NOTE — PLAN
[FreeTextEntry1] : pap obtained along with gc.ct  self breast exams encouraged  continue ocps encouraged multivitamin as well  pt advised to monitor her pelvic pain with sex- if this persists beyond her next pill pack/cycle, she is recommended to return for a pelvic US and follow up visit to investigate urine culture obtained today

## 2024-10-05 NOTE — PHYSICAL EXAM
[Chaperone Present] : A chaperone was present in the examining room during all aspects of the physical examination [[id number]] : A chaperone was present during the pelvic exam. [Appropriately responsive] : appropriately responsive [Alert] : alert [No Acute Distress] : no acute distress [Soft] : soft [Non-tender] : non-tender [Non-distended] : non-distended [No HSM] : No HSM [No Lesions] : no lesions [No Mass] : no mass [Oriented x3] : oriented x3 [Examination Of The Breasts] : a normal appearance [No Masses] : no breast masses were palpable [Labia Majora] : normal [Labia Minora] : normal [Normal] : normal [Uterine Adnexae] : normal [FreeTextEntry2] : ELOISA Barber

## 2024-10-07 LAB — BACTERIA UR CULT: NORMAL

## 2024-10-08 LAB — HPV HIGH+LOW RISK DNA PNL CVX: DETECTED

## 2024-11-16 ENCOUNTER — APPOINTMENT (OUTPATIENT)
Dept: OBGYN | Facility: CLINIC | Age: 24
End: 2024-11-16

## 2024-11-16 ENCOUNTER — APPOINTMENT (OUTPATIENT)
Dept: ANTEPARTUM | Facility: CLINIC | Age: 24
End: 2024-11-16
Payer: COMMERCIAL

## 2024-11-16 ENCOUNTER — ASOB RESULT (OUTPATIENT)
Age: 24
End: 2024-11-16

## 2024-11-16 VITALS
BODY MASS INDEX: 19.32 KG/M2 | SYSTOLIC BLOOD PRESSURE: 116 MMHG | DIASTOLIC BLOOD PRESSURE: 70 MMHG | HEIGHT: 62 IN | WEIGHT: 105 LBS

## 2024-11-16 PROCEDURE — 76857 US EXAM PELVIC LIMITED: CPT | Mod: 59

## 2024-11-16 PROCEDURE — 76830 TRANSVAGINAL US NON-OB: CPT

## 2024-11-16 PROCEDURE — 99213 OFFICE O/P EST LOW 20 MIN: CPT

## 2024-12-04 ENCOUNTER — APPOINTMENT (OUTPATIENT)
Dept: OBGYN | Facility: CLINIC | Age: 24
End: 2024-12-04
Payer: COMMERCIAL

## 2024-12-04 VITALS
SYSTOLIC BLOOD PRESSURE: 120 MMHG | WEIGHT: 105 LBS | BODY MASS INDEX: 19.32 KG/M2 | HEIGHT: 62 IN | DIASTOLIC BLOOD PRESSURE: 60 MMHG

## 2024-12-04 DIAGNOSIS — Z87.42 PERSONAL HISTORY OF OTHER DISEASES OF THE FEMALE GENITAL TRACT: ICD-10-CM

## 2024-12-04 DIAGNOSIS — R10.2 PELVIC AND PERINEAL PAIN: ICD-10-CM

## 2024-12-04 DIAGNOSIS — R87.810 CERVICAL HIGH RISK HUMAN PAPILLOMAVIRUS (HPV) DNA TEST POSITIVE: ICD-10-CM

## 2024-12-04 DIAGNOSIS — Z01.419 ENCOUNTER FOR GYNECOLOGICAL EXAMINATION (GENERAL) (ROUTINE) W/OUT ABNORMAL FINDINGS: ICD-10-CM

## 2024-12-04 DIAGNOSIS — L29.2 PRURITUS VULVAE: ICD-10-CM

## 2024-12-04 DIAGNOSIS — N89.8 OTHER SPECIFIED NONINFLAMMATORY DISORDERS OF VAGINA: ICD-10-CM

## 2024-12-04 DIAGNOSIS — Z11.3 ENCOUNTER FOR SCREENING FOR INFECTIONS WITH A PREDOMINANTLY SEXUAL MODE OF TRANSMISSION: ICD-10-CM

## 2024-12-04 DIAGNOSIS — R87.612 LOW GRADE SQUAMOUS INTRAEPITHELIAL LESION ON CYTOLOGIC SMEAR OF CERVIX (LGSIL): ICD-10-CM

## 2024-12-04 DIAGNOSIS — Z12.4 ENCOUNTER FOR SCREENING FOR MALIGNANT NEOPLASM OF CERVIX: ICD-10-CM

## 2024-12-04 LAB
HCG UR QL: NEGATIVE
QUALITY CONTROL: YES

## 2024-12-04 PROCEDURE — 57454 BX/CURETT OF CERVIX W/SCOPE: CPT

## 2024-12-04 PROCEDURE — 81025 URINE PREGNANCY TEST: CPT

## 2024-12-09 ENCOUNTER — TRANSCRIPTION ENCOUNTER (OUTPATIENT)
Age: 24
End: 2024-12-09

## 2024-12-09 PROBLEM — R10.2 PELVIC PAIN IN FEMALE: Status: RESOLVED | Noted: 2019-03-12 | Resolved: 2024-12-09

## 2024-12-09 PROBLEM — Z87.42 HISTORY OF VAGINAL PRURITUS: Status: RESOLVED | Noted: 2023-08-28 | Resolved: 2024-12-09

## 2024-12-09 PROBLEM — Z11.3 SCREENING FOR STDS (SEXUALLY TRANSMITTED DISEASES): Status: RESOLVED | Noted: 2019-05-30 | Resolved: 2024-12-09

## 2024-12-09 PROBLEM — N89.8 VAGINAL IRRITATION: Status: RESOLVED | Noted: 2020-12-28 | Resolved: 2024-12-09

## 2024-12-09 PROBLEM — Z01.419 WELL FEMALE EXAM WITH ROUTINE GYNECOLOGICAL EXAM: Status: RESOLVED | Noted: 2024-10-05 | Resolved: 2024-12-09

## 2024-12-09 PROBLEM — L29.2 VULVAR ITCHING: Status: RESOLVED | Noted: 2023-08-28 | Resolved: 2024-12-09

## 2024-12-09 PROBLEM — Z12.4 CERVICAL CANCER SCREENING: Status: RESOLVED | Noted: 2024-10-05 | Resolved: 2024-12-09

## 2024-12-09 PROBLEM — Z87.42 HISTORY OF VAGINAL DISCHARGE: Status: RESOLVED | Noted: 2020-08-19 | Resolved: 2024-12-09

## 2024-12-09 LAB — CORE LAB BIOPSY: NORMAL

## 2025-03-31 ENCOUNTER — NON-APPOINTMENT (OUTPATIENT)
Age: 25
End: 2025-03-31